# Patient Record
Sex: FEMALE | Race: WHITE | NOT HISPANIC OR LATINO | URBAN - METROPOLITAN AREA
[De-identification: names, ages, dates, MRNs, and addresses within clinical notes are randomized per-mention and may not be internally consistent; named-entity substitution may affect disease eponyms.]

---

## 2017-11-13 ENCOUNTER — HISTORICAL (OUTPATIENT)
Dept: WOUND CARE | Facility: HOSPITAL | Age: 37
End: 2017-11-13

## 2017-11-17 LAB — POC BETA-HCG (QUAL): NEGATIVE

## 2022-09-22 ENCOUNTER — HISTORICAL (OUTPATIENT)
Dept: ADMINISTRATIVE | Facility: HOSPITAL | Age: 42
End: 2022-09-22

## 2023-01-06 ENCOUNTER — PROCEDURE VISIT (OUTPATIENT)
Dept: GYNECOLOGY | Facility: CLINIC | Age: 43
End: 2023-01-06
Payer: MEDICAID

## 2023-01-06 VITALS
HEIGHT: 60 IN | OXYGEN SATURATION: 97 % | HEART RATE: 95 BPM | DIASTOLIC BLOOD PRESSURE: 76 MMHG | BODY MASS INDEX: 44.57 KG/M2 | RESPIRATION RATE: 20 BRPM | SYSTOLIC BLOOD PRESSURE: 112 MMHG | TEMPERATURE: 98 F | WEIGHT: 227 LBS

## 2023-01-06 DIAGNOSIS — Z30.9 ENCOUNTER FOR CONTRACEPTIVE MANAGEMENT, UNSPECIFIED TYPE: Primary | ICD-10-CM

## 2023-01-06 LAB
B-HCG UR QL: NEGATIVE
CTP QC/QA: YES

## 2023-01-06 PROCEDURE — 58300 INSERT INTRAUTERINE DEVICE: CPT | Mod: PBBFAC | Performed by: STUDENT IN AN ORGANIZED HEALTH CARE EDUCATION/TRAINING PROGRAM

## 2023-01-06 PROCEDURE — 81025 URINE PREGNANCY TEST: CPT | Mod: PBBFAC

## 2023-01-06 PROCEDURE — 58301 REMOVE INTRAUTERINE DEVICE: CPT | Mod: PBBFAC | Performed by: STUDENT IN AN ORGANIZED HEALTH CARE EDUCATION/TRAINING PROGRAM

## 2023-01-06 RX ADMIN — LEVONORGESTREL 1 INTRA UTERINE DEVICE: 52 INTRAUTERINE DEVICE INTRAUTERINE at 12:01

## 2023-01-06 NOTE — PROCEDURES
[facility] CLINIC NOTE  [facility]  [address] [place] [address]  Phone: [phone]  Fax: [phone]    IUD Insertion Note    Subjective:      Sita Ruiz is a 42 y.o.  who presents for Mirena IUD exchange. Patient previously with IUD placed management of AUB 5 years ago. Patient reports some intermittent spotting but significant improvement in bleeding profile with Mirena IUD in place. No symptomatic complaints at this time.     Allergies: PCN  OBHx:1x FT     MedHx:  Past Medical History:   Diagnosis Date    Carpal tunnel syndrome     Diabetes mellitus     Fibroids     Mental disorder     Retrolisthesis     Thyroid disease     Jessenia-Parkinson-White (WPW) syndrome      SurgHx:  Past Surgical History:   Procedure Laterality Date    CARDIAC ELECTROPHYSIOLOGY STUDY WITH ABLATION OF ACCESSORY CONDUCTION PATHWAY       Medications:  No current outpatient medications on file.    Current Facility-Administered Medications:     levonorgestreL (MIRENA) 20 mcg/24 hours (8 yrs) 52 mg IUD 1 Intra Uterine Device, 1 each, Intrauterine, 1 time in Clinic/HOD, Andre Gorman MD  FM Hx: Denies hx of ovarian, uterine, endometrial, or colon cancer.  Social Hx: Denies tobacco, alcohol and illicit drug usage.    Review of Systems  Denies fevers, chills, headache, blurry vision, nausea, vomiting, dizziness, or syncope.   Denies chest pain, shortness of breath, RUQ pain, or calf pain.    Objective:     Vitals:    23 1034   BP: 112/76   BP Location: Right arm   Patient Position: Sitting   BP Method: Large (Automatic)   Pulse: 95   Resp: 20   Temp: 98.3 °F (36.8 °C)   TempSrc: Oral   SpO2: 97%   Weight: 103 kg (227 lb)   Height: 5' (1.524 m)     Physical Exam:   General: alert and oriented, in no acute distress  Lungs: clear to auscultation bilaterally, no conversational dyspnea  Heart: regular rate and rhythm  Abdomen: Soft, non-distended, non tender   Extremities: Normal, atraumatic, non-edematous  External  genitalia: Normal female genitalia without lesion, discharge or tenderness. Normal appearing urethral meatus. Normal appearing external anus.    Labs  No results found for this or any previous visit (from the past 24 hour(s)).    Urine Pregnancy Test was negative.    Assessment:   Reviewed the risks, benefits, and alternatives of the contraceptive agents listed below. All questions were answered, and consents were signed prior to insertion of IUD, which is what the patient desired.    Reviewed the risks, benefits, and alternatives of the following contraceptive agents. Discussed:  Mirena IUD has localized progesterone and thus many patients have decrease in their cycles, some with amenorrhea.  Paraguard has no hormones).    Procedure Details   Urine pregnancy test was negative today .  Last pap smear: 2022: NILM, HPV-    After informed consents were signed, a time out was called. Pt placed in dorsal lithotomy position, sterile speculum inserted.  On the speculum exam, the strings were visualized coming out of the cervical os. Ringed forceps were used and the strings were pulled out without complications. Patient was shown the Mirena IUD prior to disposal. Cervix was then cleansed with Betadine. Anterior lip of the cervix grasped with the single toothed tenaculum.  Uterus sounded to 8  cm.  IUD inserted and deployed without difficulty. The IUD string was visible and trimmed and all instruments were removed from the vagina.  Tenaculum sites were hemostatic. Patient tolerated the procedure well. Minimal EBL noted.        IUD Information:   Lot #: [id number]  Exp date: 2025    Plan:   Sita Ruiz is a 42 y.o.  with successful removal and replacement of Mirena IUD after discussion. All questions answered. The patient was advised to call for any fever or for prolonged or severe pain or bleeding. She was advised to use OTC NSAIDs as needed for mild to moderate pain. Follow up with primary gyn  provider.    Patient and plan were discussed with Dr. Liriano.    Andre Gorman MD  [facility], PGY-4

## 2024-11-15 ENCOUNTER — HOSPITAL ENCOUNTER (EMERGENCY)
Facility: HOSPITAL | Age: 44
Discharge: HOME | End: 2024-11-15
Attending: EMERGENCY MEDICINE

## 2024-11-15 VITALS
HEIGHT: 60 IN | TEMPERATURE: 98 F | DIASTOLIC BLOOD PRESSURE: 87 MMHG | HEART RATE: 110 BPM | SYSTOLIC BLOOD PRESSURE: 141 MMHG | RESPIRATION RATE: 20 BRPM | BODY MASS INDEX: 40.25 KG/M2 | WEIGHT: 205 LBS | OXYGEN SATURATION: 99 %

## 2024-11-15 DIAGNOSIS — L03.90 CELLULITIS, UNSPECIFIED CELLULITIS SITE: Primary | ICD-10-CM

## 2024-11-15 DIAGNOSIS — M25.422 PAIN AND SWELLING OF LEFT ELBOW: ICD-10-CM

## 2024-11-15 DIAGNOSIS — M25.522 PAIN AND SWELLING OF LEFT ELBOW: ICD-10-CM

## 2024-11-15 PROCEDURE — 63600175 PHARM REV CODE 636 W HCPCS: Performed by: PHYSICIAN ASSISTANT

## 2024-11-15 PROCEDURE — 96372 THER/PROPH/DIAG INJ SC/IM: CPT | Performed by: PHYSICIAN ASSISTANT

## 2024-11-15 PROCEDURE — 99284 EMERGENCY DEPT VISIT MOD MDM: CPT | Mod: 25

## 2024-11-15 RX ORDER — KETOROLAC TROMETHAMINE 10 MG/1
10 TABLET, FILM COATED ORAL EVERY 6 HOURS
Qty: 20 TABLET | Refills: 0 | Status: SHIPPED | OUTPATIENT
Start: 2024-11-15 | End: 2024-11-20

## 2024-11-15 RX ORDER — SULFAMETHOXAZOLE AND TRIMETHOPRIM 800; 160 MG/1; MG/1
1 TABLET ORAL 2 TIMES DAILY
Qty: 14 TABLET | Refills: 0 | Status: SHIPPED | OUTPATIENT
Start: 2024-11-15 | End: 2024-11-22

## 2024-11-15 RX ORDER — KETOROLAC TROMETHAMINE 30 MG/ML
60 INJECTION, SOLUTION INTRAMUSCULAR; INTRAVENOUS
Status: COMPLETED | OUTPATIENT
Start: 2024-11-15 | End: 2024-11-15

## 2024-11-15 RX ADMIN — KETOROLAC TROMETHAMINE 60 MG: 30 INJECTION, SOLUTION INTRAMUSCULAR at 12:11

## 2024-11-15 NOTE — Clinical Note
"Sita "Sita" Joseph was seen and treated in our emergency department on 11/15/2024.  She may return to work on 11/16/2024.       If you have any questions or concerns, please don't hesitate to call.      Fabby Navarrete PA"

## 2024-11-15 NOTE — ED PROVIDER NOTES
Encounter Date: 11/15/2024       History     Chief Complaint   Patient presents with    Joint Swelling     Pt c/o redness and swelling to left elbow onset yesterday, denies injury.     42-year-old female presents to ED for evaluation of pain and swelling to her left elbow.  Reports having redness increasing over the last day.  Denies any drainage.  Patient denies any trauma or injury.  Denies any fever.  States she has a burning sensation in her elbow.         The history is provided by the patient. No  was used.     Review of patient's allergies indicates:   Allergen Reactions    Penicillins Hives, Itching, Rash and Swelling     Other Reaction(s): Unknown     Past Medical History:   Diagnosis Date    Carpal tunnel syndrome     Diabetes mellitus     Fibroids     Mental disorder     Retrolisthesis     Thyroid disease     Jessenia-Parkinson-White (WPW) syndrome      Past Surgical History:   Procedure Laterality Date    CARDIAC ELECTROPHYSIOLOGY STUDY WITH ABLATION OF ACCESSORY CONDUCTION PATHWAY       Family History   Problem Relation Name Age of Onset    No Known Problems Father      Breast cancer Mother       Social History     Tobacco Use    Smoking status: Every Day     Types: Cigarettes, Vaping with nicotine    Smokeless tobacco: Current   Substance Use Topics    Alcohol use: Yes     Comment: socially    Drug use: Never     Review of Systems   Constitutional:  Negative for chills, fatigue and fever.   Respiratory:  Negative for shortness of breath.    Cardiovascular:  Negative for chest pain.   Gastrointestinal:  Negative for abdominal pain, diarrhea, nausea and vomiting.   Genitourinary:  Negative for dysuria, flank pain, frequency and urgency.   Musculoskeletal:  Positive for arthralgias, joint swelling and myalgias. Negative for back pain.   All other systems reviewed and are negative.      Physical Exam     Initial Vitals [11/15/24 1128]   BP Pulse Resp Temp SpO2   (!) 141/87 110 20 98.2 °F  (36.8 °C) 99 %      MAP       --         Physical Exam    Nursing note and vitals reviewed.  Constitutional: She appears well-developed and well-nourished.   HENT:   Head: Normocephalic and atraumatic.   Right Ear: Tympanic membrane and external ear normal.   Left Ear: Tympanic membrane and external ear normal. Mouth/Throat: Uvula is midline, oropharynx is clear and moist and mucous membranes are normal. No trismus in the jaw. No uvula swelling. No oropharyngeal exudate, posterior oropharyngeal edema or posterior oropharyngeal erythema.   Eyes: Conjunctivae are normal. Pupils are equal, round, and reactive to light.   Neck: Neck supple.   Normal range of motion.  Cardiovascular:  Normal rate, regular rhythm and normal heart sounds.           Pulmonary/Chest: Breath sounds normal. She has no wheezes. She has no rhonchi. She has no rales.   Abdominal: Abdomen is soft. Bowel sounds are normal. There is no abdominal tenderness. There is no rebound and no guarding.   Musculoskeletal:         General: Normal range of motion.      Cervical back: Normal range of motion and neck supple.      Comments: Erythema and swelling noted to left elbow.  Multiple small abrasions noted.  Surrounding erythema no fluctuance.  Patient has full range of motion.  Radial pulses 2+     Neurological: She is alert and oriented to person, place, and time. She has normal strength. No cranial nerve deficit or sensory deficit. GCS score is 15. GCS eye subscore is 4. GCS verbal subscore is 5. GCS motor subscore is 6.   Skin: Skin is warm and dry.   Psychiatric: She has a normal mood and affect.         ED Course   Procedures  Labs Reviewed - No data to display       Imaging Results              X-Ray Elbow Complete Left (Final result)  Result time 11/15/24 13:03:57      Final result by Glenna Schreiber MD (11/15/24 13:03:57)                   Impression:      No acute bony abnormality.      Electronically signed by: Glenna  Candie  Date:    11/15/2024  Time:    13:03               Narrative:    EXAMINATION:  XR ELBOW COMPLETE 3 VIEW LEFT    CLINICAL HISTORY:  Pain in left elbow    COMPARISON:  None.    FINDINGS:  There is no acute fracture or malalignment.  There is no elbow joint effusion.                                       Medications   ketorolac injection 60 mg (60 mg Intramuscular Given 11/15/24 1250)     Medical Decision Making  42-year-old female presents to ED for evaluation of pain and swelling to her left elbow.  Reports having redness increasing over the last day.  Denies any drainage.  Patient denies any trauma or injury.  Denies any fever.  States she has a burning sensation in her elbow.    Differential diagnosis includes but isn't limited to bursitis, cellulitis, septic joint    Amount and/or Complexity of Data Reviewed  Radiology: ordered.  Discussion of management or test interpretation with external provider(s): Patient is afebrile and in no acute distress presents to ED for evaluation of left elbow pain and swelling.  Erythema noted.  No area of fluctuance.  No insect bite.  X-ray obtained showing no effusion.  Will place on short course of NSAIDs as well as antibiotics to cover for a cellulitis.  Discussed strict return ED precautions.  Patient verbalizes understanding and agrees with plan of care.    Risk  OTC drugs.  Prescription drug management.                                      Clinical Impression:  Final diagnoses:  [M25.522, M25.422] Pain and swelling of left elbow  [L03.90] Cellulitis, unspecified cellulitis site (Primary)          ED Disposition Condition    Discharge Stable          ED Prescriptions       Medication Sig Dispense Start Date End Date Auth. Provider    sulfamethoxazole-trimethoprim 800-160mg (BACTRIM DS) 800-160 mg Tab Take 1 tablet by mouth 2 (two) times daily. for 7 days 14 tablet 11/15/2024 11/22/2024 Fabby Navarrete PA    ketorolac (TORADOL) 10 mg tablet Take 1 tablet (10 mg total) by  mouth every 6 (six) hours. for 5 days 20 tablet 11/15/2024 11/20/2024 Fabby Navarrete PA          Follow-up Information       Follow up With Specialties Details Why Contact Info    Kelle Rodriguez, NP Family Medicine   [address] [facility] [address]  [phone]               Fabby Navarrete PA  11/15/24 1329       Fabby Navarrete PA  11/15/24 [id number]

## 2024-11-15 NOTE — DISCHARGE INSTRUCTIONS
Use ice and elevation, 20 minutes on and 20 minutes off.  Use Toradol for pain and swelling.  May alternate with Tylenol.  Take full course of antibiotics.  Follow up with PCP in 5-7 days for re-evaluation.